# Patient Record
Sex: FEMALE | URBAN - METROPOLITAN AREA
[De-identification: names, ages, dates, MRNs, and addresses within clinical notes are randomized per-mention and may not be internally consistent; named-entity substitution may affect disease eponyms.]

---
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
## 2022-04-29 NOTE — OP NOTE
Patient:   Rajani Yuen            MRN: [id number]            FIN: [id number]               Age:   18 years     Sex:  Female     :  2000   Associated Diagnoses:   None   Author:   Jennifer Dalal MD      Brief Operative Note   Operative Information   Date/ Time:  2018 16:10:00.     Preoperative Diagnosis: Acute Appendicitis  .     Postoperative Diagnosis: same  .     Procedures Performed: Laparoscopic Appendectomy.     Surgeon: Jennifer Dalal MD.     Assistant: Nora Gill MD Removed: appendix  .     Esimated blood loss: loss  15  cc.     Description of Procedure/Findings/    Complications: After informed consent was obtained, the patient was taken to the operative suite and general endotracheal anesthesia was induced. A timeout was performed confirming patient and procedure and the abdomen was prepped and draped in the normal sterile fashion. A 5mm incision was made above the patients umbilicus and using an 5mm optiview trocar the camera was introduced and peritoneal insufflation was established, and under direct visualization a 12mm port was placed in the left lower quadrant as well as a 5mm port suprapubically. We then placed the patient in trendelenburg and airplaned toward the left. The appendix was easily visualized, grasped and a window was bluntly dissected in the mesentery adjacent to the appendix. The appendix did appear thickened and enlarged at the tip. A blue staple load on an endoGIA stapler was used to transect the appendiceal base and a white staple load was used to transect the appendiceal mesentery. The appendix was placed in an endocatch bag and removed through the 12mm port. The appendiceal base was inspected and found to be hemostatic. The ports were removed under direct visualization and the 12mm port fascia was closed with 0 vicryl suture. The skin was closed with 4-0 vicryl suture and sterile dressings were applied. All sponge and  needle counts were correct at the completion of the case, the patient was awakened from anesthesia, extubated, and taken to the PACU in stable condition. .

## 2022-04-29 NOTE — ED PROVIDER NOTES
Patient:   Rajani Yuen            MRN: [id number]            FIN: [id number]               Age:   18 years     Sex:  Female     :  2000   Associated Diagnoses:   Abdominal pain, acute, generalized   Author:   Valdemar Ho MD      Basic Information   Time seen: Date & time 2018 08:22:00.   History source: Patient, family.   Arrival mode: Private vehicle, walking.   History limitation: None.   Additional information: Chief Complaint from Nursing Triage Note : Chief Complaint   2018 8:14 CDT        Chief Complaint           Pt c/o of pain lower right abdominal pain, subjective fever, N/V/D x1 month.  Was seen for same complaint thursday. States CT showed enlarged appendix.  .      History of Present Illness   The patient presents with abdominal pain, Patient complains or right lower abdominal pain with fever.  This is been ongoing for a month.  Patient also complaining of nausea, vomiting, diarrhea.  Was seen in the ER for the same on Thursday.  Signed out AMA.  CT showed an enlarged appendix./ kelli guzman pa-c and       I, Dr. Ho, assumed care of this patient upon walking into the room at 0829.  19 y/o CF presents to the ED, accompanied by family, c/o continued RLQ abd pain which has been ongoing for the past month, now worsening. Pt was dx with acute UTI on 18 and was Rx Cipro. Pt continued to experience symptoms so PCP scheduled pt for an abd CT to r/o nephrolithiasis. She was seen at [facility] on 18 for right flank pain, which resulted in admission and pt was transferred here. Abd CT r/o nephrolithiasis, however, showed appendix was slightly enlarged. Pt was to follow up with Dr. Joshi on 18. She had a gallbladder US performed a few wks ago which was normal. She reports N/V x 1 month and had subjective fever yesterday (18). She is experiencing dysuria, is currently menstruating and denies fever..  The onset was 2018 06:00:00 .  The course/duration of  symptoms is constant (ongoing) and worsening (today (8/5/18) @ 0600).  The character of symptoms is achy.  The degree at onset was moderate.  The Location of pain at onset was right, lower and abdominal.  The degree at present is moderate.  The Location of pain at present is right, lower and abdominal.  Radiating pain: none. The exacerbating factor is none.  The relieving factor is none.  Therapy today: none.  Risk factors consist of none.  Associated symptoms: nausea, vomiting and fever (subective).        Review of Systems   Constitutional symptoms:  Fever (subjective), no chills, no sweats, no weakness, no fatigue, no decreased activity.    Skin symptoms:  No jaundice, no rash, no pruritus, no abrasions, no breakdown, no burns, no dryness, no petechiae, no lesion.    Eye symptoms:  Vision unchanged.   ENMT symptoms:  No ear pain, no sore throat, no nasal congestion, no sinus pain.    Respiratory symptoms:  No shortness of breath, no orthopnea, no cough, no hemoptysis, no stridor, no wheezing.    Cardiovascular symptoms:  No chest pain, no palpitations, no tachycardia, no syncope, no diaphoresis, no peripheral edema.    Gastrointestinal symptoms:  Abdominal pain, moderate, right lower quadrant, pain, nausea, vomiting.    Genitourinary symptoms:  Dysuria, No hematuria,    Musculoskeletal symptoms:  No back pain, no Muscle pain, no Joint pain, no Claudication.    Neurologic symptoms:  No headache, no dizziness, no altered level of consciousness, no numbness, no tingling, no weakness.              Additional review of systems information: All systems reviewed as documented in chart.      Health Status   Allergies: No known allergies.   Medications:  (Selected)   Prescriptions  Prescribed  Macrobid 100 mg oral capsule: 100 mg = 1 cap(s), Oral, BID, X 7 day(s), # 14 cap(s), 0 Refill(s).   Menstrual history: currently menstruating (8/5/18).      Past Medical/ Family/ Social History   Medical history:    Active  ADHD  (attention deficit hyperactivity disorder), combined type ([id number])  Depression ([id number]).   Surgical history:    Adenoids (432589465).  Tonsillectomy with adenoidectomy (25369807).  Adenoidectomy (719301327).  Bilateral laser tonsillectomy ([id number])..   Family history: Not significant.   Social history: Alcohol use: Denies, Tobacco use: Regularly, Drug use: Denies.      Physical Examination               Vital Signs   Vital Signs   8/5/2018 8:14 CDT        Temperature Oral          36.9 DegC                             Temperature Oral (calculated)             98.42 DegF                             Peripheral Pulse Rate     90 bpm                             Respiratory Rate          19 br/min                             SpO2                      98 %                             Oxygen Therapy            Room air                             Systolic Blood Pressure   129 mmHg                             Diastolic Blood Pressure  83 mmHg  .   Measurements   8/5/2018 8:14 CDT        Weight Dosing             77 kg                             Weight Measured and Calculated in Lbs     169.75 lb                             Weight Estimated          77 kg                             Height/Length Dosing      149 cm                             Height/Length Estimated   149 cm                             Body Mass Index Estimated 34.68 kg/m2                             Body Mass Index Percentile                97.45  .   Basic Oxygen Information   8/5/2018 8:14 CDT        SpO2                      98 %                             Oxygen Therapy            Room air  .   General:  Alert, no acute distress.    Skin:  Warm, dry, no rash.    Head:  Normocephalic, atraumatic.    Neck:  Supple, trachea midline, no tenderness.    Eye:  Extraocular movements are intact, vision unchanged.    Ears, nose, mouth and throat:  Oral mucosa moist.   Respiratory:  Lungs are clear to auscultation, respirations  are non-labored, breath sounds are equal, Symmetrical chest wall expansion.    Cardiovascular:  Regular rate and rhythm, No murmur, Normal peripheral perfusion.    Gastrointestinal:  Soft, Non distended, Normal bowel sounds, No organomegaly, Tenderness: Mild, right lower quadrant, Guarding: Negative, Rebound: Negative.    Musculoskeletal:  Normal ROM, normal strength.    Neurological:  Alert and oriented to person, place, time, and situation, No focal neurological deficit observed, normal motor observed.    Psychiatric:  Cooperative, appropriate mood & affect.       Medical Decision Making   Documents reviewed:  Emergency department nurses' notes.   Orders  Laboratory    CBC w/ Auto Diff, Leslee Drew, 08/05/18, 08:23, Completed    Automated Diff, Leslee Drew, 08/05/18, 08:48, Completed    CMP, Leslee Drew, 08/05/18, 08:23, Ordered    UPT - Urine Pregancy Test, Valdemar Ho MD, 08/05/18, 08:35, Ordered    UA Total a reflex to culture, Valdemar Ho MD, 08/05/18, 08:35, Ordered  CT / MRI / Ultrasound    CT Abdomen and Pelvis W Contrast, Valdemar Ho MD, 08/05/18, 08:38, Ordered.   Results review:  Lab results : Lab View   8/5/2018 8:40 CDT        U Beta hCG Ql             Negative                             UA Appear                 CLEAR                             UA Color                  YELLOW                             UA Spec Grav              1.020                             UA Bili                   Negative                             UA pH                     5.0                             UA Urobilinogen           0.2                             UA Blood                  3+                             UA Glucose                Negative                             UA Ketones                Negative                             UA Protein                Negative                             UA Nitrite                Negative                             UA Leuk  Est               Negative                             UA WBC                    NONE SEEN                             UA RBC                    82 /HPF  HI                             UA Bacteria               NONE SEEN /HPF                             UA Squam Epithelial       NONE SEEN    8/5/2018 8:24 CDT        Sodium Lvl                141 mmol/L                             Potassium Lvl             4.1 mmol/L                             Chloride                  108 mmol/L  HI                             CO2                       24.0 mmol/L                             Calcium Lvl               9.3 mg/dL                             Glucose Lvl               118 mg/dL                             BUN                       8.0 mg/dL                             Creatinine                0.54 mg/dL                             eGFR-AA                   >60 mL/min/1.73 m2  NA                             eGFR-KEAGAN                  >60 mL/min/1.73 m2  NA                             Bili Total                0.1 mg/dL  LOW                             Bili Direct               0.10 mg/dL                             Bili Indirect             0.00 mg/dL                             AST                       24 unit/L                             ALT                       29 unit/L                             Alk Phos                  79 unit/L                             Total Protein             7.2 gm/dL                             Albumin Lvl               3.80 gm/dL                             Globulin                  3.40 gm/dL                             A/G Ratio                 1.1 ratio                             WBC                       9.5 x10(3)/mcL                             RBC                       4.18 x10(6)/mcL  LOW                             Hgb                       12.2 gm/dL                             Hct                       37.8 %                             Platelet                  282  x10(3)/mcL                             MCV                       90.4 fL                             MCH                       29.2 pg                             MCHC                      32.3 gm/dL  LOW                             RDW                       12.3 %                             MPV                       8.8 fL  LOW                             Abs Neut                  6.59 x10(3)/mcL                             Neutro Auto               70 %  NA                             Lymph Auto                25 %  NA                             Mono Auto                 4 %  NA                             Eos Auto                  1 %  NA                             Abs Eos                   0.1 x10(3)/mcL                             Basophil Auto             0 %  NA                             Abs Neutro                6.59 x10(3)/mcL                             Abs Lymph                 2.3 x10(3)/mcL                             Abs Mono                  0.4 x10(3)/mcL                             Abs Baso                  0.0 x10(3)/mcL  ,    No qualifying data available.    Radiology results:  Rad Results (ST)  < 12 hrs   Accession: [id number]  Order: CT Abdomen and Pelvis W Contrast  Report Dt/Tm: 08/05/2018 09:50  Report:      History.  Abdominal pain.     Reference.  2 August 2018. 4 January 2016.     Technique.  Helical acquisition through the abdomen and pelvis with IV contrast.  Three plane reconstructions were provided for review.  mGycm.  Automatic exposure control, adjustment of mA/kV or iterative  reconstruction technique was used to reduce radiation.     Findings.  The limited imaged lung bases are clear.     Solid abdominal organs are unremarkable.      There is no bowel obstruction. Similar appearance of the appendix with  focal dilatation of the midportion, possible appendicolith. The  periappendiceal fat is not significantly inflamed. Appearance of the  appendix is similar going back  to 2016. There are several prominent  right lower quadrant lymph nodes. No free air. No peritoneal  collections. There is moderate stool in the colon.     Urinary bladder is unremarkable. No free fluid. Aorta normal in  caliber.      Bones are unremarkable.     Impression.  No significant change compared to scan of 3 days prior. Again there is  focal dilatation midportion of the appendix but no significant  inflammation in the periappendiceal fat. There are some nonspecific  prominent right lower quadrant lymph nodes which could be seen with a  mesenteric adenitis.      .      Reexamination/ Reevaluation   Time: 8/5/2018 10:29:00 .   Course: well controlled.      Impression and Plan   Diagnosis   Abdominal pain, acute, generalized ([id number] R10.84)      Calls-Consults   -  8/5/2018 10:38:00 , Surgical hospitalist, examined patient, they will let me know of disposition of the patient.    Plan   Condition: Stable.    Disposition: Discharged: Time  8/5/2018 10:30:00, to home.    Counseled: Patient, Family, Regarding diagnostic results, Regarding treatment plan, Patient indicated understanding of instructions, Family understood.    Notes: I, Saadia Zamarripa, acted solely as a scribe for and in the presence of Dr. Ho who performed the service., I acknowledged that the documentation on this chart was provided by a scribe on the date of service noted above and that the documentation in the chart accurately reflects work and decisions made by me alone..

## 2022-05-04 NOTE — HISTORICAL [facility] CERNER
This is a historical note converted from Moira. Formatting and pictures may have been removed.  Please reference Moira for original formatting and attached multimedia. Admit Date:  ?   Discharge date:  ?   Admitting Physician: Dr. Jennifer Dalal  ?   Discharge Physician: Dr. Alec Hinson  ?  Admission Diagnosis: Acute appendicitis, possible appendiceal mass  ?  Discharge diagnosis: same  ?  Admission Condition: stable  ?  Discharge condition: stable  ?  Hospital Course:  19 y/o F presented to the ED with intermittent abdominal pain, nausea and vomiting.? On evaluation, the patient was found to have ttp in the RLQ and epigastrium, leukocytosis, and CT scan showed focal dilation of the midportion of the appendix.? Patient was later taken to the OR for lap appendectomy.? Please see Op note for additional details.? Following the operation, the patient was able to tolerate a regular diet.? Her pain was well controlled and she was ambulating and voiding without difficulty.? She is ready for discharge.? She will need to follow up with the acute care surgery clinic in 2 weeks.  ?  Consults: None  ?  Diagnostic Studies:  CT Abd: No significant change compared to scan of 3 days prior. Again there is  focal dilatation midportion of the appendix but no significant  inflammation in the periappendiceal fat. There are some nonspecific  prominent right lower quadrant lymph nodes which could be seen with a  mesenteric adenitis.  ?  RUQ US:  IMPRESSION: Suboptimal visualization of the midline structures.  Otherwise, no sonographic abnormality is identified.  ?  Treatments: Laparoscopic appendectomy  ?  Disposition: To home/self care  ?  Activity: As tolerated  ?  Diet: Ad merna  ?  Wound Care: Steristrips in place.? They should flake off in ~1week.? Ok to shower, clean with soap and water, pat dry.? do not submerge incision.  ?  Follow-up: She will need to follow up with the acute care surgery clinic in 2 weeks.  ?  Plan discussed  with patient and all questions answered  ?  Nora Gill  General Surgery, [facility]

## 2022-05-04 NOTE — HISTORICAL [facility] CERNER
This is a historical note converted from Moira. Formatting and pictures may have been removed.  Please reference Moira for original formatting and attached multimedia. Chief Complaint  Pt c/o of pain lower right abdominal pain, subjective fever, N/V/D x1 month. Was seen for same complaint thursday. States CT showed enlarged appendix.  History of Present Illness  18-year-old obese white female with a several month history of intermittent abdominal pain. ?Over the last several weeks she has had to leave work several times due to nausea and vomiting that is not associated with pain. ?They appear to come at separate times. ?The patient cannot identify any inciting factors for the nausea pain. ?She states that the pain is in the right upper quadrant somewhat underneath the right rib cage. ?She has never had right lower quadrant abdominal pain. ?Currently, she has no pain at all and no nausea. ?She has not had fevers. ?Her bowel movements have been regular.?  ?  Review of Systems  REVIEW OF SYSTEMS:  ?CONSTITUTIONAL: There is no fever, weight loss or cough.  ?CENTRAL NERVOUS SYSTEM: No ?vision changes, seizure or weakness.  ?ENT: No ?congestion, postnasal drip, sore throat, nose bleeds, or hearing changes.  ?RESPIRATORY: No history of shortness of breath, wheezing or chest pain.  ?CARDIOVASCULAR: No history of chest palpitations or arrhythmias.  ?GASTROINTESTINAL: Patient complains of nausea and vomiting?and diarrhea?as well as abdominal pain?for the last couple  ?GENITOURINARY: No history of dysuria.  ?MUSCULOSKELETAL: No weakness, pain, paresthesia, temperature differences.?  ?  Physical Exam  Vitals & Measurements  T:?36.9? ?C (Oral)? HR:?90(Peripheral)? RR:?19? BP:?122/73? SpO2:?98%? WT:?77?kg? WT:?77?kg?  GENERAL APPEARANCE: Well developed, well nourished, alert and cooperative, and appears to be in no acute distress.  HEAD: normocephalic.  EYES: PERRL, EOMI. Vision is grossly intact.  EARS: Hearing grossly  intact.  NOSE: No nasal discharge.  THROAT: Oral cavity and pharynx normal. No inflammation, swelling, exudate, or lesions.?  NECK: Neck supple, non-tender without lymphadenopathy..  CARDIAC: Normal S1 and S2. Rhythm is regular. There is no peripheral edema. Extremities are warm and well perfused. Capillary refill is less than 2 seconds.?  LUNGS: Clear to auscultation without rales, rhonchi, wheezing or diminished breath sounds.  ABDOMEN: Positive bowel sounds. Soft, nondistended, tender to palpation in the right lower quadrant as well as epigastric?quadrant pain. No guarding or rebound. No masses. ?Negative rebound tenderness.  MUSKULOSKELETAL: ROM intact for extremities. No joint erythema or tenderness. Normal muscular development. Normal gait.  EXTREMITIES: No significant deformity or joint abnormality. No edema. Peripheral pulses intact. No varicosities.  LOWER EXTREMITY: examination of both ankles, feet, knees, legs, and hips reveals normal range of motion, normal sensation without tenderness, swelling, discoloration, crepitus, weakness or deformity.  NEUROLOGICAL: Strength and sensation symmetric and intact throughout. Reflexes 2+ throughout.  SKIN: Skin normal color, texture and turgor with no lesions or eruptions.  PSYCHIATRIC: The mental examination revealed the patient was oriented to person, place, and time. The patient was able to demonstrate good judgement and reason, without hallucinations, abnormal affect or abnormal behaviors during the examination. Patient is not suicidal.?  ?  Assessment/Plan  19 y/o F with early appendicitis vs biliary colic.  ?  - Admit for observation  - RUQ US to r/o cholelithiasis  - To the OR today  - MIVF  - IV antibiotics  - Pain control   Problem List/Past Medical History  Ongoing  ADHD (attention deficit hyperactivity disorder), combined type  ADHD - Attention deficit disorder with hyperactivity  Depression  Depression  Historical  No qualifying data  Procedure/Surgical  History  Adenoidectomy  Adenoids  Bilateral laser tonsillectomy  Tonsillectomy with adenoidectomy   Medications  Inpatient  Lactated Ringers Injection intravenous solution 1,000 mL, 1000 mL, IV  morphine, 2 mg= 0.5 mL, IV, q4hr, PRN  Zosyn, 3.375 gm= 50 mL, IV Piggyback, [id number]  Home  Macrobid 100 mg oral capsule, 100 mg= 1 cap(s), Oral, BID,? ?Not taking  Allergies  No Known Allergies  Social History  Alcohol - Denies Alcohol Use, 01/30/2015  Never, 09/23/2017  Substance Abuse - Denies Substance Abuse, 01/30/2015  Never, 09/23/2017  Tobacco - High Risk, 01/04/2016  Cigarettes, 20 per day., 01/04/2016  Current every day smoker, Cigarettes, 2 per day., 01/30/2015  Lab Results  Sodium 141 potassium, 124 glucose 119 BUN creatinine 0.54  WBC 9.5 H 12.2 / 37.8  ?  Diagnostic Results  CT Abd: No significant change compared to scan of 3 days prior. Again there is  focal dilatation midportion of the appendix but no significant  inflammation in the periappendiceal fat. There are some nonspecific  prominent right lower quadrant lymph nodes which could be seen with a  mesenteric adenitis.  ?      I agree with resident documentation. I was physically present, supervised resident, ?and discussed plan of care.  to OR for Lap remi,  ate this am, will proceed with us of RUQ prior to surgery given nebulous symptoms

## 2022-05-04 NOTE — HISTORICAL [facility] CERNER
This is a historical note converted from Moira. Formatting and pictures may have been removed.  Please reference Moira for original formatting and attached multimedia. [facility] Clinic Note  ?   CC  f/u lap appendectomy  ?   HPI  17 y/o F with acute appendicitis s/p lap appendectomy on 8/5/18 presenting for followup.? Patient with no complaints this morning.? She denies pain and is tolerating a regular diet.? Having regular BMs.? She has also returned to her previous level of activity.  ?   PE  Gen: NAD  CV: RRR, no murmur, no S3/S4  Pulm: CTAB, no wheezes or crackles  Abd: s/ nt/nd/+BS  Wounds: healing well  ?  Path  Final Diagnosis?(Verified)  APPENDIX:  MILD ACUTE APPENDICITIS, IMPACTED FECALITH.  ?  A/P  17 y/o F s/p lap appendectomy on 8/5/18  -Doing well  -No acute issues  -F/u PRN  ?  Nora Gill  General Surgery, [facility]   I agree with resident documentation. I was physically present, supervised resident, ?and discussed plan of care.

## 2024-04-28 RX ORDER — NORGESTIMATE AND ETHINYL ESTRADIOL 0.25-0.035
1 KIT ORAL DAILY
COMMUNITY

## 2024-04-28 RX ORDER — SERTRALINE HYDROCHLORIDE 50 MG/1
50 TABLET, FILM COATED ORAL DAILY
COMMUNITY

## 2024-04-28 RX ORDER — OMEPRAZOLE 40 MG/1
40 CAPSULE, DELAYED RELEASE ORAL NIGHTLY
COMMUNITY

## 2024-05-02 ENCOUNTER — ANESTHESIA EVENT (OUTPATIENT)
Dept: SURGERY | Facility: HOSPITAL | Age: 24
End: 2024-05-02
Payer: MEDICAID

## 2024-05-02 RX ORDER — FAMOTIDINE 20 MG/1
20 TABLET, FILM COATED ORAL
Status: CANCELLED | OUTPATIENT
Start: 2024-05-02

## 2024-05-02 NOTE — DISCHARGE INSTRUCTIONS
Patient Education        Fallopian Tube Removal Discharge Instructions       What care is needed at home?   Be sure to wash your hands before and after touching your wound or dressing. You have dermabond (skin glue). You may wash your incision with soap and water. The dermabond (skin glue) will fall off within 1-2 weeks. You do not have to peel it off. Do not pick at it (infection prevention).  You may take showers 24 hours after your surgery. You should wash between your legs with soap and water very well to reduce your chance of infection.   Do not lift anything over 10 pounds (4.5 kg). Avoid activities like heavy lifting and hard exercise.   Do not use tampons, douche, or have sex for 2 weeks following your surgery or until told so by your doctor.  You can expect some bleeding from your vagina for a few weeks. You may use sanitary pads but not tampons.  Your bowel movements may take some time to get back to normal. Eat small meals high in fiber to avoid hard stools. Drink 6 to 8 glasses of water each day.  Try to walk each day. Start by walking a little more than you did the day before. Walking boosts blood flow and helps prevent lung, belly, and blood problems.  Talk with your doctor about safe sex as you can still be exposed to sexually transmitted diseases.  Use a small pillow to put pressure on your belly. The pressure can make you more comfortable when you cough, laugh, or do other actions.     What follow-up care is needed?   Be sure to keep your follow up visit.     Will physical activity be limited?   Rest for the first few days after the procedure. Talk to your doctor about the right amount of activity for you.    What problems could happen?   Infertility if both fallopian tubes were removed  Infection  Wound opening  Bleeding  Blood clots in your legs or lungs  Injury to nearby organs  Risk for an ectopic pregnancy    When do I need to call the doctor?   Signs of infection such as a fever of 100.4°F  (38°C) or higher, chills, pain with passing urine, wound that will not heal, or anal itching  Signs of wound infection such as swelling, redness, warmth around the wound; too much pain when touched; yellowish, greenish, or bloody discharge; foul smell coming from the cut site; cut site opens up  Lots of blood in your sanitary pads or more than 6 soaked pads per day  Upset stomach, throwing up, or very bad belly pain  No bowel movement after 3 days  You feel the need to pass urine but it will not come out even after 6 hours  Smelly, green, or dark yellow vaginal discharge  Feeling short of breath  Pain or swelling in one or both legs

## 2024-05-02 NOTE — H&P
[facility] [address] [place] [address]    PATIENT NAME:       OMERO Yuen   YOB: 2000  CSN:                [id number]   MRN:                [id number]  ADMIT DATE:         2024 00:00:00  PHYSICIAN:          Tian Carey Jr, MD                        HISTORY AND PHYSICAL      HISTORY OF PRESENT ILLNESS:  A 24-year-old multiparous female with undesired   fertility, desires a permanent sterilization.  All aspects of the procedure were   explained in detail to the patient including the risk of tubal failure 3 to 5   per 1000, increased risk of ectopic if gestation was to occur.  The patient   understands that the reversal options would allow her to obtain her fertility in   the future.  She understands there is a high incidence of regret in patients   who tie their tubes at a young age multiple times in the future, they wish to   have it reverse.  We explained to her in detail, spent multiple visits with   20-30 minutes each visit.  We are offering reversible options including IUD,   birth control pills.  The patient is adamant about wanting a permanent procedure   done.  All aspects of the procedure were explained in detail to the patient   including the fact that trocars were placed in the belly and she could have an   injury to the bowel, bladder or blood vessels that may or may not be recognized   at the time of the surgery, which may require further surgical therapy, blood   transfusion, possibly indwelling Stack catheter or a colostomy.  All questions   were answered.  The patient consents to the surgical procedure.    ALLERGIES:  NO KNOWN DRUG ALLERGIES.     MEDICATIONS:  Zoloft 50.    FAMILY HISTORY:  Hypertension and diabetes.    SURGICAL HISTORY:   section x3, tonsils and adenoids, appendectomy,   gallbladder and wisdom teeth.    PAST MEDICAL HISTORY:  Anemia and  anxiety.    SOCIAL HISTORY:  Denies alcohol, tobacco or polysubstance abuse.    GYN HISTORY:  Denies abnormal Paps or sexually transmitted diseases.    PHYSICAL EXAMINATION:  VITAL SIGNS:  Blood pressure 137/82, respirations 18, temperature 98.7.  HEART:  S1-S2.  No murmurs.  LUNGS:  Clear.  ABDOMEN:  Soft, nontender.  EXTREMITIES:  Had trace lower extremity edema.  EXTERNAL GENITALIA:  Normal.  Cervix parous, no lesions.  Uterus normal and   mobile.  Adnexa, no masses were appreciated.  NEUROLOGICAL:  The patient was intact.    ASSESSMENT:  Undesired fertility.    PLAN:  Laparoscopic tubal cauterization in the a.m.        ______________________________  MD ADRIANNA Torres Jr/YOLY  DD:  05/02/2024  Time:  10:04AM  DT:  05/02/2024  Time:  10:33AM  Job #:  [id number]      HISTORY AND PHYSICAL

## 2024-05-03 ENCOUNTER — ANESTHESIA (OUTPATIENT)
Dept: SURGERY | Facility: HOSPITAL | Age: 24
End: 2024-05-03
Payer: MEDICAID

## 2024-05-03 ENCOUNTER — HOSPITAL ENCOUNTER (OUTPATIENT)
Facility: HOSPITAL | Age: 24
Discharge: HOME | End: 2024-05-03
Attending: OBSTETRICS & GYNECOLOGY | Admitting: OBSTETRICS & GYNECOLOGY
Payer: MEDICAID

## 2024-05-03 DIAGNOSIS — N93.9 ABNORMAL UTERINE BLEEDING: ICD-10-CM

## 2024-05-03 PROBLEM — Z30.2 STERILIZATION: Status: ACTIVE | Noted: 2024-05-03

## 2024-05-03 LAB
B-HCG UR QL: NEGATIVE
CTP QC/QA: YES

## 2024-05-03 PROCEDURE — 36000708 HC OR TIME LEV III 1ST 15 MIN: Performed by: OBSTETRICS & GYNECOLOGY

## 2024-05-03 PROCEDURE — 25000003 PHARM REV CODE 250: Performed by: ANESTHESIOLOGY

## 2024-05-03 PROCEDURE — 71000033 HC RECOVERY, INTIAL HOUR: Performed by: OBSTETRICS & GYNECOLOGY

## 2024-05-03 PROCEDURE — 63600175 PHARM REV CODE 636 W HCPCS: Performed by: OBSTETRICS & GYNECOLOGY

## 2024-05-03 PROCEDURE — 37000009 HC ANESTHESIA EA ADD 15 MINS: Performed by: OBSTETRICS & GYNECOLOGY

## 2024-05-03 PROCEDURE — 63600175 PHARM REV CODE 636 W HCPCS: Performed by: NURSE ANESTHETIST, CERTIFIED REGISTERED

## 2024-05-03 PROCEDURE — D9220A PRA ANESTHESIA: Mod: CRNA,,, | Performed by: NURSE ANESTHETIST, CERTIFIED REGISTERED

## 2024-05-03 PROCEDURE — 36000709 HC OR TIME LEV III EA ADD 15 MIN: Performed by: OBSTETRICS & GYNECOLOGY

## 2024-05-03 PROCEDURE — 63600175 PHARM REV CODE 636 W HCPCS: Performed by: ANESTHESIOLOGY

## 2024-05-03 PROCEDURE — 71000015 HC POSTOP RECOV 1ST HR: Performed by: OBSTETRICS & GYNECOLOGY

## 2024-05-03 PROCEDURE — 63600175 PHARM REV CODE 636 W HCPCS

## 2024-05-03 PROCEDURE — D9220A PRA ANESTHESIA: Mod: ANES,,, | Performed by: ANESTHESIOLOGY

## 2024-05-03 PROCEDURE — 71000016 HC POSTOP RECOV ADDL HR: Performed by: OBSTETRICS & GYNECOLOGY

## 2024-05-03 PROCEDURE — 37000008 HC ANESTHESIA 1ST 15 MINUTES: Performed by: OBSTETRICS & GYNECOLOGY

## 2024-05-03 PROCEDURE — 25000003 PHARM REV CODE 250: Performed by: NURSE ANESTHETIST, CERTIFIED REGISTERED

## 2024-05-03 PROCEDURE — 81025 URINE PREGNANCY TEST: CPT | Performed by: OBSTETRICS & GYNECOLOGY

## 2024-05-03 RX ORDER — DIPHENHYDRAMINE HCL 25 MG
25 CAPSULE ORAL EVERY 4 HOURS PRN
Status: DISCONTINUED | OUTPATIENT
Start: 2024-05-03 | End: 2024-05-03 | Stop reason: HOSPADM

## 2024-05-03 RX ORDER — ONDANSETRON HYDROCHLORIDE 2 MG/ML
INJECTION, SOLUTION INTRAMUSCULAR; INTRAVENOUS
Status: DISCONTINUED | OUTPATIENT
Start: 2024-05-03 | End: 2024-05-03

## 2024-05-03 RX ORDER — PROPOFOL 10 MG/ML
VIAL (ML) INTRAVENOUS
Status: DISCONTINUED | OUTPATIENT
Start: 2024-05-03 | End: 2024-05-03

## 2024-05-03 RX ORDER — ACETAMINOPHEN 10 MG/ML
1000 INJECTION, SOLUTION INTRAVENOUS ONCE
Status: COMPLETED | OUTPATIENT
Start: 2024-05-03 | End: 2024-05-03

## 2024-05-03 RX ORDER — HYDROMORPHONE HYDROCHLORIDE 2 MG/ML
0.4 INJECTION, SOLUTION INTRAMUSCULAR; INTRAVENOUS; SUBCUTANEOUS EVERY 5 MIN PRN
Status: DISCONTINUED | OUTPATIENT
Start: 2024-05-03 | End: 2024-05-03 | Stop reason: HOSPADM

## 2024-05-03 RX ORDER — BUPIVACAINE HYDROCHLORIDE 5 MG/ML
INJECTION, SOLUTION EPIDURAL; INTRACAUDAL
Status: DISCONTINUED
Start: 2024-05-03 | End: 2024-05-03 | Stop reason: HOSPADM

## 2024-05-03 RX ORDER — OXYCODONE AND ACETAMINOPHEN 5; 325 MG/1; MG/1
1 TABLET ORAL EVERY 6 HOURS PRN
Qty: 20 TABLET | Refills: 0 | Status: SHIPPED | OUTPATIENT
Start: 2024-05-03

## 2024-05-03 RX ORDER — DIPHENHYDRAMINE HYDROCHLORIDE 50 MG/ML
25 INJECTION INTRAMUSCULAR; INTRAVENOUS EVERY 4 HOURS PRN
Status: DISCONTINUED | OUTPATIENT
Start: 2024-05-03 | End: 2024-05-03 | Stop reason: HOSPADM

## 2024-05-03 RX ORDER — ONDANSETRON HYDROCHLORIDE 2 MG/ML
4 INJECTION, SOLUTION INTRAVENOUS DAILY PRN
Status: DISCONTINUED | OUTPATIENT
Start: 2024-05-03 | End: 2024-05-03 | Stop reason: HOSPADM

## 2024-05-03 RX ORDER — SCOLOPAMINE TRANSDERMAL SYSTEM 1 MG/1
1 PATCH, EXTENDED RELEASE TRANSDERMAL ONCE
Status: DISCONTINUED | OUTPATIENT
Start: 2024-05-03 | End: 2024-05-03 | Stop reason: HOSPADM

## 2024-05-03 RX ORDER — SODIUM CHLORIDE, SODIUM GLUCONATE, SODIUM ACETATE, POTASSIUM CHLORIDE AND MAGNESIUM CHLORIDE 30; 37; 368; 526; 502 MG/100ML; MG/100ML; MG/100ML; MG/100ML; MG/100ML
INJECTION, SOLUTION INTRAVENOUS CONTINUOUS
Status: DISCONTINUED | OUTPATIENT
Start: 2024-05-03 | End: 2024-05-03 | Stop reason: HOSPADM

## 2024-05-03 RX ORDER — HYDROCODONE BITARTRATE AND ACETAMINOPHEN 5; 325 MG/1; MG/1
1 TABLET ORAL EVERY 4 HOURS PRN
Status: DISCONTINUED | OUTPATIENT
Start: 2024-05-03 | End: 2024-05-03 | Stop reason: HOSPADM

## 2024-05-03 RX ORDER — SODIUM CHLORIDE 9 MG/ML
INJECTION, SOLUTION INTRAVENOUS CONTINUOUS
Status: DISCONTINUED | OUTPATIENT
Start: 2024-05-03 | End: 2024-05-03 | Stop reason: HOSPADM

## 2024-05-03 RX ORDER — EPHEDRINE SULFATE 50 MG/ML
INJECTION, SOLUTION INTRAVENOUS
Status: DISCONTINUED | OUTPATIENT
Start: 2024-05-03 | End: 2024-05-03

## 2024-05-03 RX ORDER — ROCURONIUM BROMIDE 10 MG/ML
INJECTION, SOLUTION INTRAVENOUS
Status: DISCONTINUED | OUTPATIENT
Start: 2024-05-03 | End: 2024-05-03

## 2024-05-03 RX ORDER — ONDANSETRON 4 MG/1
8 TABLET, ORALLY DISINTEGRATING ORAL EVERY 8 HOURS PRN
Status: DISCONTINUED | OUTPATIENT
Start: 2024-05-03 | End: 2024-05-03 | Stop reason: HOSPADM

## 2024-05-03 RX ORDER — OXYCODONE AND ACETAMINOPHEN 5; 325 MG/1; MG/1
1 TABLET ORAL EVERY 6 HOURS PRN
Qty: 20 TABLET | Refills: 0 | Status: SHIPPED | OUTPATIENT
Start: 2024-05-03 | End: 2024-05-03

## 2024-05-03 RX ORDER — ONDANSETRON 4 MG/1
8 TABLET, ORALLY DISINTEGRATING ORAL EVERY 6 HOURS PRN
Status: DISCONTINUED | OUTPATIENT
Start: 2024-05-03 | End: 2024-05-03

## 2024-05-03 RX ORDER — MEPERIDINE HYDROCHLORIDE 25 MG/ML
12.5 INJECTION INTRAMUSCULAR; INTRAVENOUS; SUBCUTANEOUS EVERY 10 MIN PRN
Status: DISCONTINUED | OUTPATIENT
Start: 2024-05-03 | End: 2024-05-03 | Stop reason: HOSPADM

## 2024-05-03 RX ORDER — MORPHINE SULFATE 4 MG/ML
3 INJECTION, SOLUTION INTRAMUSCULAR; INTRAVENOUS
Status: DISCONTINUED | OUTPATIENT
Start: 2024-05-03 | End: 2024-05-03 | Stop reason: HOSPADM

## 2024-05-03 RX ORDER — MUPIROCIN 20 MG/G
OINTMENT TOPICAL
Status: DISCONTINUED | OUTPATIENT
Start: 2024-05-03 | End: 2024-05-03 | Stop reason: HOSPADM

## 2024-05-03 RX ORDER — FENTANYL CITRATE 50 UG/ML
INJECTION, SOLUTION INTRAMUSCULAR; INTRAVENOUS
Status: DISCONTINUED | OUTPATIENT
Start: 2024-05-03 | End: 2024-05-03

## 2024-05-03 RX ORDER — MIDAZOLAM HYDROCHLORIDE 1 MG/ML
INJECTION INTRAMUSCULAR; INTRAVENOUS
Status: COMPLETED
Start: 2024-05-03 | End: 2024-05-03

## 2024-05-03 RX ORDER — DEXAMETHASONE SODIUM PHOSPHATE 4 MG/ML
INJECTION, SOLUTION INTRA-ARTICULAR; INTRALESIONAL; INTRAMUSCULAR; INTRAVENOUS; SOFT TISSUE
Status: DISCONTINUED | OUTPATIENT
Start: 2024-05-03 | End: 2024-05-03

## 2024-05-03 RX ORDER — MIDAZOLAM HYDROCHLORIDE 2 MG/2ML
2 INJECTION, SOLUTION INTRAMUSCULAR; INTRAVENOUS ONCE AS NEEDED
Status: COMPLETED | OUTPATIENT
Start: 2024-05-03 | End: 2024-05-03

## 2024-05-03 RX ORDER — PROCHLORPERAZINE EDISYLATE 5 MG/ML
5 INJECTION INTRAMUSCULAR; INTRAVENOUS EVERY 30 MIN PRN
Status: DISCONTINUED | OUTPATIENT
Start: 2024-05-03 | End: 2024-05-03 | Stop reason: HOSPADM

## 2024-05-03 RX ORDER — LIDOCAINE HYDROCHLORIDE 10 MG/ML
1 INJECTION, SOLUTION EPIDURAL; INFILTRATION; INTRACAUDAL; PERINEURAL ONCE
Status: DISCONTINUED | OUTPATIENT
Start: 2024-05-03 | End: 2024-05-03 | Stop reason: HOSPADM

## 2024-05-03 RX ORDER — LIDOCAINE HYDROCHLORIDE 10 MG/ML
INJECTION, SOLUTION EPIDURAL; INFILTRATION; INTRACAUDAL; PERINEURAL
Status: DISCONTINUED | OUTPATIENT
Start: 2024-05-03 | End: 2024-05-03

## 2024-05-03 RX ORDER — IPRATROPIUM BROMIDE AND ALBUTEROL SULFATE 2.5; .5 MG/3ML; MG/3ML
3 SOLUTION RESPIRATORY (INHALATION)
Status: DISCONTINUED | OUTPATIENT
Start: 2024-05-03 | End: 2024-05-03 | Stop reason: HOSPADM

## 2024-05-03 RX ORDER — TRAMADOL HYDROCHLORIDE 50 MG/1
50 TABLET ORAL EVERY 4 HOURS PRN
Status: DISCONTINUED | OUTPATIENT
Start: 2024-05-03 | End: 2024-05-03 | Stop reason: HOSPADM

## 2024-05-03 RX ORDER — HYDROMORPHONE HYDROCHLORIDE 2 MG/ML
0.2 INJECTION, SOLUTION INTRAMUSCULAR; INTRAVENOUS; SUBCUTANEOUS EVERY 5 MIN PRN
Status: DISCONTINUED | OUTPATIENT
Start: 2024-05-03 | End: 2024-05-03 | Stop reason: HOSPADM

## 2024-05-03 RX ORDER — CELECOXIB 200 MG/1
200 CAPSULE ORAL ONCE
Status: COMPLETED | OUTPATIENT
Start: 2024-05-03 | End: 2024-05-03

## 2024-05-03 RX ORDER — METHOCARBAMOL 100 MG/ML
1000 INJECTION, SOLUTION INTRAMUSCULAR; INTRAVENOUS ONCE AS NEEDED
Status: COMPLETED | OUTPATIENT
Start: 2024-05-03 | End: 2024-05-03

## 2024-05-03 RX ORDER — SODIUM CHLORIDE, SODIUM LACTATE, POTASSIUM CHLORIDE, CALCIUM CHLORIDE 600; 310; 30; 20 MG/100ML; MG/100ML; MG/100ML; MG/100ML
INJECTION, SOLUTION INTRAVENOUS CONTINUOUS
Status: DISCONTINUED | OUTPATIENT
Start: 2024-05-03 | End: 2024-05-03 | Stop reason: HOSPADM

## 2024-05-03 RX ADMIN — METHOCARBAMOL 1000 MG: 100 INJECTION INTRAMUSCULAR; INTRAVENOUS at 09:05

## 2024-05-03 RX ADMIN — SODIUM CHLORIDE, POTASSIUM CHLORIDE, SODIUM LACTATE AND CALCIUM CHLORIDE: 600; 310; 30; 20 INJECTION, SOLUTION INTRAVENOUS at 06:05

## 2024-05-03 RX ADMIN — ACETAMINOPHEN 1000 MG: 10 INJECTION, SOLUTION INTRAVENOUS at 06:05

## 2024-05-03 RX ADMIN — HYDROMORPHONE HYDROCHLORIDE 0.4 MG: 2 INJECTION, SOLUTION INTRAMUSCULAR; INTRAVENOUS; SUBCUTANEOUS at 09:05

## 2024-05-03 RX ADMIN — LIDOCAINE HYDROCHLORIDE 50 MG: 10 INJECTION, SOLUTION EPIDURAL; INFILTRATION; INTRACAUDAL; PERINEURAL at 08:05

## 2024-05-03 RX ADMIN — EPHEDRINE SULFATE 25 MG: 50 INJECTION INTRAVENOUS at 09:05

## 2024-05-03 RX ADMIN — DEXAMETHASONE SODIUM PHOSPHATE 4 MG: 4 INJECTION, SOLUTION INTRA-ARTICULAR; INTRALESIONAL; INTRAMUSCULAR; INTRAVENOUS; SOFT TISSUE at 08:05

## 2024-05-03 RX ADMIN — PROPOFOL 200 MG: 10 INJECTION, EMULSION INTRAVENOUS at 08:05

## 2024-05-03 RX ADMIN — ONDANSETRON 4 MG: 2 INJECTION INTRAMUSCULAR; INTRAVENOUS at 08:05

## 2024-05-03 RX ADMIN — FENTANYL CITRATE 25 MCG: 50 INJECTION, SOLUTION INTRAMUSCULAR; INTRAVENOUS at 08:05

## 2024-05-03 RX ADMIN — MIDAZOLAM HYDROCHLORIDE 2 MG: 2 INJECTION, SOLUTION INTRAMUSCULAR; INTRAVENOUS at 08:05

## 2024-05-03 RX ADMIN — ROCURONIUM BROMIDE 50 MG: 50 INJECTION INTRAVENOUS at 08:05

## 2024-05-03 RX ADMIN — CELECOXIB 200 MG: 200 CAPSULE ORAL at 06:05

## 2024-05-03 RX ADMIN — PROCHLORPERAZINE EDISYLATE 5 MG: 5 INJECTION INTRAMUSCULAR; INTRAVENOUS at 11:05

## 2024-05-03 RX ADMIN — MIDAZOLAM HYDROCHLORIDE 2 MG: 1 INJECTION, SOLUTION INTRAMUSCULAR; INTRAVENOUS at 08:05

## 2024-05-03 RX ADMIN — FENTANYL CITRATE 50 MCG: 50 INJECTION, SOLUTION INTRAMUSCULAR; INTRAVENOUS at 09:05

## 2024-05-03 RX ADMIN — SUGAMMADEX 100 MG: 100 INJECTION, SOLUTION INTRAVENOUS at 09:05

## 2024-05-03 RX ADMIN — SCOPOLAMINE 1 PATCH: 1 PATCH TRANSDERMAL at 07:05

## 2024-05-03 RX ADMIN — SODIUM CHLORIDE, POTASSIUM CHLORIDE, SODIUM LACTATE AND CALCIUM CHLORIDE: 600; 310; 30; 20 INJECTION, SOLUTION INTRAVENOUS at 09:05

## 2024-05-03 RX ADMIN — SUGAMMADEX 200 MG: 100 INJECTION, SOLUTION INTRAVENOUS at 09:05

## 2024-05-03 NOTE — TRANSFER OF CARE
"Anesthesia Transfer of Care Note    Patient: Rajani Yuen    Procedure(s) Performed: Procedure(s) (LRB):  LIGATION, FALLOPIAN TUBE, LAPAROSCOPIC (N/A)    Patient location: PACU    Anesthesia Type: general    Transport from OR: Transported from OR on room air with adequate spontaneous ventilation    Post pain: adequate analgesia    Post assessment: no apparent anesthetic complications    Post vital signs: stable    Level of consciousness: sedated    Nausea/Vomiting: no nausea/vomiting    Complications: none    Transfer of care protocol was followed      Last vitals: Visit Vitals  /74   Pulse 79   Temp 36.9 °C (98.5 °F) (Oral)   Resp 18   Ht 4' 11" (1.499 m)   Wt 93.4 kg (205 lb 14.6 oz)   LMP 03/29/2024 (Exact Date)   SpO2 97%   Breastfeeding No   BMI 41.59 kg/m²     "

## 2024-05-03 NOTE — ANESTHESIA POSTPROCEDURE EVALUATION
Anesthesia Post Evaluation    Patient: Rajani Yuen    Procedure(s) Performed: Procedure(s) (LRB):  LIGATION, FALLOPIAN TUBE, LAPAROSCOPIC (N/A)    Final Anesthesia Type: general      Patient location during evaluation: floor  Patient participation: Yes- Able to Participate  Level of consciousness: awake and alert  Post-procedure vital signs: reviewed and stable  Pain management: adequate  Airway patency: patent    PONV status at discharge: No PONV  Anesthetic complications: no      Cardiovascular status: blood pressure returned to baseline  Respiratory status: spontaneous ventilation and room air  Hydration status: euvolemic  Follow-up not needed.              Vitals Value Taken Time   /77 05/03/24 1023   Temp 36.6 °C (97.9 °F) 05/03/24 1024   Pulse 92 05/03/24 1024   Resp 21 05/03/24 1020   SpO2 96 % 05/03/24 1024         Event Time   Out of Recovery 10:21:00         Pain/Fatuma Score: Pain Rating Prior to Med Admin: 6 (5/3/2024  9:55 AM)  Pain Rating Post Med Admin: 3 (5/3/2024 10:00 AM)  Fatuma Score: 9 (5/3/2024 10:20 AM)

## 2024-05-03 NOTE — PLAN OF CARE
Vitals signs stable. Pain and nausea well controlled. Discharge criteria/Fatuma met.Ok for transfer to phase II per Dr. Mcmanus.

## 2024-05-03 NOTE — ANESTHESIA PROCEDURE NOTES
Intubation    Date/Time: 5/3/2024 8:38 AM    Performed by: Marilou Chou CRNA  Authorized by: Cali Mcmanus Jr., MD    Intubation:     Induction:  Intravenous    Intubated:  Postinduction    Mask Ventilation:  Easy mask    Attempts:  1    Attempted By:  CRNA    Method of Intubation:  Direct    Blade:  Perales 2    Laryngeal View Grade: Grade IIA - cords partially seen      Difficult Airway Encountered?: No      Complications:  None    Airway Device:  Oral endotracheal tube    Airway Device Size:  6.5    Style/Cuff Inflation:  Cuffed (inflated to minimal occlusive pressure)    Inflation Amount (mL):  4    Tube secured:  21    Secured at:  The lips    Placement Verified By:  Capnometry    Complicating Factors:  None    Findings Post-Intubation:  BS equal bilateral  Notes:      ETT cuff pressure [id number]

## 2024-05-03 NOTE — OP NOTE
[facility]                       [address] [place] [address]    PATIENT NAME:      OMERO MALHOTRA  YOB: 2000  CSN:               [id number]  MRN:               [id number]  ADMIT DATE:        05/03/2024 06:01:00  PHYSICIAN:         Tian Carey Jr, MD                          OPERATIVE REPORT      DATE OF SURGERY:    05/03/2024 00:00:00    SURGEON:  Tian Carey Jr, MD    TITLE OF OPERATION:  Laparoscopic tubal cauterization    DESCRIPTION OF PROCEDURE:  The patient was taken to the operating room, where   general anesthesia was administered and found to be adequate.  Abdomen,   perineum, and vagina were prepped and draped in the usual sterile fashion.  The   infraumbilical skin incision was made with a knife.  A 5 mm trocar was inserted   directly to the abdomen.  Pneumoperitoneum was created with 2.5 L of CO2 gas.  A   left lateral port was also placed, a Kleppinger was inserted through the left   lateral port.  The acorn manipulator that had previously been placed was used to   manipulate the uterus.  The uterus was noted to be normal ovaries and fallopian   tubes all normal.  The fallopian tube was grasped 3 cm from the cornual region   and burned from successive segment until no viable tissue was noted.  The same   was carried on the opposite side.  The rest of abdominal pelvic survey was noted   to be normal.  No adhesions or abnormalities were noted.  The pneumoperitoneum   was allowed to escape.  The puncture sites were on the belly were closed with a   4-0 Monocryl.  The patient had small amount of bleeding from the puncture sites   on the cervix.  Hemostasis was achieved with a figure-of-eight suture with a 2-0   chromic.  The patient tolerated the procedure well.    COUNT:  Needle, sponge, and lap counts were correct x2.    BLOOD LOSS:  Less than 20  mL.        ______________________________  MD ADRIANNA Torres Jr/YOLY  DD:  05/03/2024  Time:  09:31AM  DT:  05/03/2024  Time:  11:58AM  Job #:  [id number]      OPERATIVE REPORT

## 2024-05-03 NOTE — ANESTHESIA PREPROCEDURE EVALUATION
"                                                                                                             05/03/2024  Rajani Yuen is a 24 y.o., female with PONV & BMI of 42 presents as an outpatient for laparoscopic tubal ligation (undesired fertility).  Proximally 4 months postpartum and not breastfeeding.    Last 3 sets of Vitals        4/28/2024    11:26 AM 5/3/2024     6:33 AM 5/3/2024     7:02 AM   Vitals - 1 value per visit   SYSTOLIC  107    DIASTOLIC  74    Pulse  79    Temp  36.9 °C (98.5 °F)    Resp   18   SPO2  97 %    Weight (lb) 200     Weight (kg) 90.719     Height 4' 11" (1.499 m)     BMI (Calculated) 40.4           Pre-op Assessment    I have reviewed the Patient Summary Reports.    I have reviewed the NPO Status.   I have reviewed the Medications.     Review of Systems  Anesthesia Hx:              Personal Hx of Anesthesia complications, Post-Operative Nausea/Vomiting                    Social:  Non-Smoker       Cardiovascular:   Functional Capacity good / => 4 METS                         Hepatic/GI:     GERD, well controlled             Endocrine:        Morbid Obesity / BMI > 40      Physical Exam  General: Well nourished, Cooperative, Alert and Oriented    Airway:  Mouth Opening: Normal  TM Distance: Normal  Tongue: Normal  Neck ROM: Normal ROM    Dental:  Intact    Chest/Lungs:  Clear to auscultation, Normal Respiratory Rate    Heart:  Rate: Normal  Rhythm: Regular Rhythm        Anesthesia Plan  Type of Anesthesia, risks & benefits discussed:    Anesthesia Type: Gen ETT  Intra-op Monitoring Plan: Standard ASA Monitors  Post Op Pain Control Plan: multimodal analgesia and IV/PO Opioids PRN  Induction:  IV  Airway Plan: Direct  Informed Consent: Informed consent signed with the Patient and all parties understand the risks and agree with anesthesia plan.  All questions answered.   ASA Score: 3  Day of Surgery Review of History & Physical: H&P Update referred to the " surgeon/provider.    Ready For Surgery From Anesthesia Perspective.     .

## 2024-05-03 NOTE — DISCHARGE SUMMARY
[facility]                       [address] [address]    PATIENT NAME:       OMERO MALHOTRA  YOB: 2000  CSN:                [id number]   MRN:                [id number]  ADMIT DATE:         05/03/2024 06:01:00  PHYSICIAN:          Tian Carey Jr, MD                          DISCHARGE SUMMARY    DATE OF DISCHARGE:  05/03/2024 00:00:00    DIAGNOSIS:  Status post laparoscopic tubal cauterization.    HOSPITAL COURSE:  The patient with the procedure without incident.  She was   admitted to the postoperative service, where she had an uneventful and speedy   recovery.  She ambulated and tolerated a regular diet.  Vitals stable.  She is   afebrile.  She had normal bowel and bladder function.  Pain was well controlled.    She was discharged home.    CONDITION:  Stable.    DIET:  Regular.    ACTIVITY:  Pelvic rest.    MEDICATIONS:    1. Percocet p.r.n.  2. Motrin p.r.n.    FOLLOWUP:  Follow up with Dr. Carey in 3 weeks.        ______________________________  Tian Carey Jr, MD    DJE/AQS  DD:  05/03/2024  Time:  09:34AM  DT:  05/03/2024  Time:  12:11PM  Job #:  [id number]      DISCHARGE SUMMARY

## 2024-05-04 VITALS
HEART RATE: 110 BPM | RESPIRATION RATE: 21 BRPM | SYSTOLIC BLOOD PRESSURE: 105 MMHG | TEMPERATURE: 98 F | HEIGHT: 59 IN | WEIGHT: 205.94 LBS | OXYGEN SATURATION: 97 % | BODY MASS INDEX: 41.52 KG/M2 | DIASTOLIC BLOOD PRESSURE: 61 MMHG

## (undated) DEVICE — BLADE SURG STAINLESS STEEL #11

## (undated) DEVICE — SUT VICRYL PLUS 4-0 FS-2 27IN

## (undated) DEVICE — TUBING INSUFFLATOR W/ROT CONCT

## (undated) DEVICE — GLOVE SIGNATURE ESSNTL LTX 8

## (undated) DEVICE — JELLY SURGILUBE LUBE TUBE 2OZ

## (undated) DEVICE — ADHESIVE DERMABOND ADVANCED

## (undated) DEVICE — ELECTRODE PATIENT RETURN DISP

## (undated) DEVICE — TRAY SKIN SCRUB WET PREMIUM

## (undated) DEVICE — CANNULA ENDOPATH XCEL 5X100MM

## (undated) DEVICE — COVER PROXIMA MAYO STAND

## (undated) DEVICE — SOL IRRI STRL WATER 1000ML

## (undated) DEVICE — SUT VICRYL 2-0 27 CT-1

## (undated) DEVICE — SUPPORT ULNA NERVE PROTECTOR

## (undated) DEVICE — TROCAR ENDOPATH XCEL 5X100MM

## (undated) DEVICE — Device

## (undated) DEVICE — KIT GYN LAPAROSCOPY LAFAYETTE

## (undated) DEVICE — NDL HYPO REG 25G X 1 1/2